# Patient Record
Sex: FEMALE | Race: WHITE | NOT HISPANIC OR LATINO | Employment: UNEMPLOYED | URBAN - METROPOLITAN AREA
[De-identification: names, ages, dates, MRNs, and addresses within clinical notes are randomized per-mention and may not be internally consistent; named-entity substitution may affect disease eponyms.]

---

## 2023-07-24 ENCOUNTER — OFFICE VISIT (OUTPATIENT)
Dept: PRIMARY CARE | Facility: CLINIC | Age: 28
End: 2023-07-24
Payer: MEDICAID

## 2023-07-24 VITALS
RESPIRATION RATE: 17 BRPM | OXYGEN SATURATION: 96 % | HEIGHT: 66 IN | WEIGHT: 144.3 LBS | BODY MASS INDEX: 23.19 KG/M2 | SYSTOLIC BLOOD PRESSURE: 122 MMHG | HEART RATE: 90 BPM | DIASTOLIC BLOOD PRESSURE: 78 MMHG | TEMPERATURE: 98.5 F

## 2023-07-24 DIAGNOSIS — Z00.00 ANNUAL PHYSICAL EXAM: ICD-10-CM

## 2023-07-24 DIAGNOSIS — F43.21 SITUATIONAL DEPRESSION: ICD-10-CM

## 2023-07-24 DIAGNOSIS — J45.20 MILD INTERMITTENT ASTHMA, UNSPECIFIED WHETHER COMPLICATED (HHS-HCC): ICD-10-CM

## 2023-07-24 DIAGNOSIS — F41.9 ANXIETY: Primary | ICD-10-CM

## 2023-07-24 DIAGNOSIS — F17.200 TOBACCO DEPENDENCE: ICD-10-CM

## 2023-07-24 PROCEDURE — 99214 OFFICE O/P EST MOD 30 MIN: CPT | Performed by: FAMILY MEDICINE

## 2023-07-24 RX ORDER — VALACYCLOVIR HYDROCHLORIDE 500 MG/1
500 TABLET, FILM COATED ORAL DAILY
COMMUNITY

## 2023-07-24 RX ORDER — SERTRALINE HYDROCHLORIDE 25 MG/1
25 TABLET, FILM COATED ORAL DAILY
Qty: 30 TABLET | Refills: 2 | Status: SHIPPED | OUTPATIENT
Start: 2023-07-24 | End: 2023-11-08 | Stop reason: SDUPTHER

## 2023-07-24 RX ORDER — ALBUTEROL SULFATE 90 UG/1
2 AEROSOL, METERED RESPIRATORY (INHALATION) EVERY 4 HOURS PRN
COMMUNITY
Start: 2023-07-13 | End: 2023-09-26 | Stop reason: SDUPTHER

## 2023-07-24 ASSESSMENT — ANXIETY QUESTIONNAIRES
3. WORRYING TOO MUCH ABOUT DIFFERENT THINGS: NEARLY EVERY DAY
7. FEELING AFRAID AS IF SOMETHING AWFUL MIGHT HAPPEN: NEARLY EVERY DAY
2. NOT BEING ABLE TO STOP OR CONTROL WORRYING: SEVERAL DAYS
6. BECOMING EASILY ANNOYED OR IRRITABLE: NEARLY EVERY DAY
5. BEING SO RESTLESS THAT IT IS HARD TO SIT STILL: NEARLY EVERY DAY
4. TROUBLE RELAXING: NEARLY EVERY DAY
GAD7 TOTAL SCORE: 19
IF YOU CHECKED OFF ANY PROBLEMS ON THIS QUESTIONNAIRE, HOW DIFFICULT HAVE THESE PROBLEMS MADE IT FOR YOU TO DO YOUR WORK, TAKE CARE OF THINGS AT HOME, OR GET ALONG WITH OTHER PEOPLE: EXTREMELY DIFFICULT
1. FEELING NERVOUS, ANXIOUS, OR ON EDGE: NEARLY EVERY DAY

## 2023-07-24 ASSESSMENT — ENCOUNTER SYMPTOMS
NERVOUS/ANXIOUS: 1
DYSPHORIC MOOD: 1

## 2023-07-24 ASSESSMENT — PATIENT HEALTH QUESTIONNAIRE - PHQ9
SUM OF ALL RESPONSES TO PHQ9 QUESTIONS 1 AND 2: 1
1. LITTLE INTEREST OR PLEASURE IN DOING THINGS: NOT AT ALL
2. FEELING DOWN, DEPRESSED OR HOPELESS: SEVERAL DAYS

## 2023-07-24 ASSESSMENT — PAIN SCALES - GENERAL: PAINLEVEL: 0-NO PAIN

## 2023-07-24 NOTE — PROGRESS NOTES
"Subjective   Patient ID: Rhoda Sandoval is a 27 y.o. female who presents for Establish Care (Follow up from Urgent care- 2 weeks ago for wheezing/anxiety/asthma. ).    HPI :  Establish care     Going thro' difficult times : , ex  is in new relationship  and dismissive of her .  Was not allowed to see her kids today.   Anxiety and panic episodes from life events      for 7 years, mother of 2 children, 5 and 3  5 year old is special needs: born with hydrocephalus. Has IQ of year old now  Denies previous psychiatric problems.  IUD for contraception  Noconcern for STI  Smokes 1/2 pack per day    Work lot : [employer]  for year    Review of Systems   Psychiatric/Behavioral:  Positive for behavioral problems (irritable) and dysphoric mood. Negative for suicidal ideas. The patient is nervous/anxious.    All other systems reviewed and are negative.      Objective   /78 (BP Location: Left arm, Patient Position: Sitting, BP Cuff Size: Adult)   Pulse 90   Temp 36.9 °C (98.5 °F) (Temporal)   Resp 17   Ht 1.676 m (5' 6\")   Wt 65.5 kg (144 lb 4.8 oz)   SpO2 96%   BMI 23.29 kg/m²     Physical Exam  Vitals and nursing note reviewed.   Cardiovascular:      Rate and Rhythm: Normal rate and regular rhythm.   Neurological:      Mental Status: She is alert.   Psychiatric:         Attention and Perception: Attention normal.         Mood and Affect: Affect is tearful.         Speech: Speech normal.         Behavior: Behavior normal.         Thought Content: Thought content normal.         Judgment: Judgment normal.         Assessment/Plan   Problem List Items Addressed This Visit    None  Visit Diagnoses       Anxiety    -  Primary    Relevant Medications    sertraline (Zoloft) 25 mg tablet    Other Relevant Orders    Comprehensive Metabolic Panel    CBC and Auto Differential    TSH with reflex to Free T4 if abnormal    Hepatitis C antibody    HIV 1/2 antibodies, rapid    Lipid panel    Referral to " Psychology    Annual physical exam        Tobacco dependence        Situational depression        Mild intermittent asthma, unspecified whether complicated

## 2023-09-25 DIAGNOSIS — J45.20 MILD INTERMITTENT ASTHMA, UNSPECIFIED WHETHER COMPLICATED (HHS-HCC): Primary | ICD-10-CM

## 2023-09-25 NOTE — TELEPHONE ENCOUNTER
Rx Refill Request Telephone Encounter    Name:  Rhoda Sandoval  :  [id number]  Medication Name:    Requested Prescriptions     Pending Prescriptions Disp Refills    albuterol 90 mcg/actuation inhaler 18 g 11     Sig: Inhale 2 puffs every 4 hours if needed for wheezing.   Specific Pharmacy location:    [id number] - [place], [place] - [address] [place]   Phone: [phone] Fax: [phone]  Date of last appointment:  2023  Date of next appointment:  10/23/2023  Best number to reach patient:  [phone] (home)

## 2023-09-26 RX ORDER — ALBUTEROL SULFATE 90 UG/1
2 AEROSOL, METERED RESPIRATORY (INHALATION) EVERY 4 HOURS PRN
Qty: 18 G | Refills: 11 | OUTPATIENT
Start: 2023-09-26 | End: 2024-09-25

## 2023-09-26 RX ORDER — ALBUTEROL SULFATE 90 UG/1
2 AEROSOL, METERED RESPIRATORY (INHALATION) EVERY 6 HOURS PRN
Qty: 18 G | Refills: 3 | Status: SHIPPED | OUTPATIENT
Start: 2023-09-26

## 2023-10-10 ENCOUNTER — OFFICE VISIT (OUTPATIENT)
Dept: URGENT CARE | Facility: CLINIC | Age: 28
End: 2023-10-10
Payer: MEDICAID

## 2023-10-10 VITALS
HEART RATE: 86 BPM | RESPIRATION RATE: 25 BRPM | TEMPERATURE: 98.2 F | OXYGEN SATURATION: 95 % | DIASTOLIC BLOOD PRESSURE: 80 MMHG | SYSTOLIC BLOOD PRESSURE: 120 MMHG

## 2023-10-10 DIAGNOSIS — J06.9 ACUTE URI: ICD-10-CM

## 2023-10-10 DIAGNOSIS — J45.21 MILD INTERMITTENT ASTHMA WITH EXACERBATION (HHS-HCC): Primary | ICD-10-CM

## 2023-10-10 PROCEDURE — 99214 OFFICE O/P EST MOD 30 MIN: CPT | Performed by: FAMILY MEDICINE

## 2023-10-10 RX ORDER — PREDNISONE 20 MG/1
60 TABLET ORAL DAILY
Qty: 15 TABLET | Refills: 0 | Status: SHIPPED | OUTPATIENT
Start: 2023-10-10 | End: 2023-10-15

## 2023-10-10 RX ORDER — INHALER, ASSIST DEVICES
SPACER (EA) MISCELLANEOUS
Qty: 1 EACH | Refills: 0 | Status: SHIPPED | OUTPATIENT
Start: 2023-10-10

## 2023-10-10 RX ORDER — ALBUTEROL SULFATE 90 UG/1
AEROSOL, METERED RESPIRATORY (INHALATION)
Qty: 6.7 G | Refills: 0 | Status: SHIPPED | OUTPATIENT
Start: 2023-10-10

## 2023-10-10 ASSESSMENT — ENCOUNTER SYMPTOMS
SORE THROAT: 0
SHORTNESS OF BREATH: 1
SINUS PRESSURE: 0
FEVER: 0
HEADACHES: 0
CHILLS: 0
WHEEZING: 1
RHINORRHEA: 1
CONSTIPATION: 0
FREQUENCY: 0
VOMITING: 0
NAUSEA: 0
DYSURIA: 0
DIARRHEA: 0
CHEST TIGHTNESS: 1
ABDOMINAL PAIN: 0
PALPITATIONS: 0
COUGH: 1

## 2023-10-10 NOTE — PATIENT INSTRUCTIONS
You have a viral upper respiratory infection with cough and acute bronchitis with bronchospasm  Please take prednisone as a single dose early in the day with food as prescribed  Use inhaler with spacer device 2 puffs every 4-6 hours for the next 7-10 days, decrease frequency of use as symptoms improve. Please take 5-6 deep breaths after activating inhaler from spacer chamber. Pause for approximately 1-2 minutes.  Repeat activation of inhaler and 5-6 deep breaths from spacer.  Increase oral fluids for the next 7-10 days  After you have completed the prednisone, you may use ibuprofen 200mg, 2 tabs by mouth every 8 hours with food as needed for discomfort or fever.  May take Tylenol 325 mg 2 tablets by mouth every 4-6 hours as needed for discomfort or fever  If no improvement in 5-7 days follow-up with primary care provider  If fever greater than 102 degrees Fahrenheit, chills, nausea, vomiting,  increased difficulty breathing, difficulty swallowing,  increased shortness of breath, worsening wheezing go to emergency department for further evaluation  This note was generated by voice recognition software. Minor transcription/grammatical errors may be present. Please call for clarification.

## 2023-10-11 NOTE — PROGRESS NOTES
Subjective   Patient ID: Rhoda Sandoval is a 28 y.o. female.      28-year-old  female presents acutely short of breath.  Reports that she began having difficulty breathing with wheezing and tightness in the chest earlier this morning.  On further inquiry she has had a history of reactive airway problems in the past.  Normally has an albuterol inhaler available but reports that she is been out of this medication for some time.  She is a smoker.  She reports she was told that her respiratory problems were entirely related to her smoking and then she did not have asthma.      History provided by:  Patient  Shortness of Breath  Associated symptoms: cough and wheezing    Associated symptoms: no abdominal pain, no ear pain, no fever, no headaches, no sore throat and no vomiting        The following portions of the chart were reviewed this encounter and updated as appropriate:         Review of Systems   Constitutional:  Negative for chills and fever.   HENT:  Positive for congestion and rhinorrhea. Negative for ear pain, sinus pressure and sore throat.    Respiratory:  Positive for cough, chest tightness, shortness of breath and wheezing.    Cardiovascular:  Negative for palpitations.   Gastrointestinal:  Negative for abdominal pain, constipation, diarrhea, nausea and vomiting.   Genitourinary:  Negative for dysuria and frequency.   Neurological:  Negative for headaches.     Objective   Physical Exam    Vital signs are reviewed. Pulse oximetry on presentation 91%.  Tachycardic at 113 bpm.Alert and oriented x3 with normal mood and   Anxiousaffect  Patient is well nourished, well-developed, alert and in no acute distress    External eyes, orbits, conjunctiva and eyelids are normal in appearance  Pupils are equal, round, reactive to light and accommodation, extraocular movements intact    External ears appear normal  External canals are normal in appearance  Right tympanic membrane is intact and  pale gray in  appearance  Left tympanic membrane is intact and  pale gray in appearance  There is no middle ear effusion noted on the right  There is no middle ear effusion noted on the left  External appearance of the nose is normal  Nasal mucosa, septum, turbinates are  moderately reddened and slightly swollen in appearance  There is  thin yellow nasal discharge in both nares    Oral mucosa is uniformly pink and moist  Palate is pink, symmetric and intact  Tongue is moist, mobile and midline  Tonsils are present, not enlarged,  mildly erythematous with no concretions or exudates present  No cervical lymphadenopathy palpated    Heart has regular rate and rhythm. No murmurs, rubs or gallops are auscultated at this exam.    Respiratory rate rhythm and effort are  initially elevated.  Following DuoNeb aerosol respiratory rate normalizes and work of breathing decreased. Breath sounds bilaterally are   Initially distant and tight with limited respiratory excursion and obvious mild distress.  Following  DuoNeb noticeable increase in airway noise and respiratory excursion.  There are now clearly audible wheezes in all lung fields.  Second recheck prior to discharge respiratory rate continues to be normal with continued improvement in respiratory excursion and subjectively reported improvement per patient.  Breath sounds are nearly clear on auscultation with scattered dry crackles.  No rhonchi or friction rub.  inspiratory and expiratory wheezes still evident.    Abdomen: Normal bowel sounds on auscultation. Soft, nontender without rebound or rigidity on palpation          Procedures    Assessment/Plan   Diagnoses and all orders for this visit:  Mild intermittent asthma with exacerbation  -     predniSONE (Deltasone) 20 mg tablet; Take 3 tablets (60 mg) by mouth once daily for 5 days.  -     inhalational spacing device (Aerochamber MV) inhaler; Use as instructed  -     albuterol (Proventil HFA) 90 mcg/actuation inhaler; T Take 1 puff  via spacer,  take 5-6 easy breaths.  Rest 2 minutes.  Repeat x1.  May use every 4-6 hours as needed  Acute URI  -     predniSONE (Deltasone) 20 mg tablet; Take 3 tablets (60 mg) by mouth once daily for 5 days.  -     inhalational spacing device (Aerochamber MV) inhaler; Use as instructed  -     albuterol (Proventil HFA) 90 mcg/actuation inhaler; T Take 1 puff via spacer,  take 5-6 easy breaths.  Rest 2 minutes.  Repeat x1.  May use every 4-6 hours as needed

## 2023-10-23 ENCOUNTER — APPOINTMENT (OUTPATIENT)
Dept: PRIMARY CARE | Facility: CLINIC | Age: 28
End: 2023-10-23
Payer: MEDICAID

## 2023-11-07 PROBLEM — F17.200 NICOTINE DEPENDENCE: Status: ACTIVE | Noted: 2023-11-07

## 2023-11-07 PROBLEM — R06.2 WHEEZING: Status: ACTIVE | Noted: 2023-11-07

## 2023-11-07 PROBLEM — K11.20 SIALADENITIS: Status: ACTIVE | Noted: 2023-06-28

## 2023-11-07 PROBLEM — B00.9 HSV INFECTION: Status: ACTIVE | Noted: 2023-11-07

## 2023-11-07 PROBLEM — R06.02 SOB (SHORTNESS OF BREATH): Status: ACTIVE | Noted: 2023-11-07

## 2023-11-07 PROBLEM — K50.90 CROHN'S DISEASE (MULTI): Status: ACTIVE | Noted: 2023-11-07

## 2023-11-07 PROBLEM — M27.0 TORUS MANDIBULARIS: Status: ACTIVE | Noted: 2023-06-28

## 2023-11-07 PROBLEM — J30.9 CHRONIC ALLERGIC RHINITIS: Status: ACTIVE | Noted: 2023-11-07

## 2023-11-08 DIAGNOSIS — F41.9 ANXIETY: ICD-10-CM

## 2023-11-08 RX ORDER — SERTRALINE HYDROCHLORIDE 25 MG/1
25 TABLET, FILM COATED ORAL DAILY
Qty: 90 TABLET | Refills: 0 | Status: SHIPPED | OUTPATIENT
Start: 2023-11-08 | End: 2024-02-06

## 2023-11-08 NOTE — TELEPHONE ENCOUNTER
Rx Refill Request Telephone Encounter    Name:  Rhoda Sandoval  :  [id number]  Medication Name:    Requested Prescriptions     Pending Prescriptions Disp Refills    sertraline (Zoloft) 25 mg tablet 90 tablet 3     Sig: Take 1 tablet (25 mg) by mouth once daily.   Specific Pharmacy location:    [id number] - [place], [place] - [address] AT [address] [place]   Phone: [phone] Fax: [phone]  Date of last appointment:  2023  Date of next appointment:  None scheduled  Best number to reach patient:  [phone] (home)

## 2023-11-13 ENCOUNTER — TELEPHONE (OUTPATIENT)
Dept: PRIMARY CARE | Facility: CLINIC | Age: 28
End: 2023-11-13
Payer: MEDICAID

## 2023-11-13 DIAGNOSIS — F41.9 ANXIETY: Primary | ICD-10-CM

## 2023-11-13 DIAGNOSIS — F43.21 SITUATIONAL DEPRESSION: ICD-10-CM

## 2023-12-05 ENCOUNTER — APPOINTMENT (OUTPATIENT)
Dept: PRIMARY CARE | Facility: CLINIC | Age: 28
End: 2023-12-05
Payer: MEDICAID